# Patient Record
Sex: FEMALE | Race: OTHER
[De-identification: names, ages, dates, MRNs, and addresses within clinical notes are randomized per-mention and may not be internally consistent; named-entity substitution may affect disease eponyms.]

---

## 2018-09-28 ENCOUNTER — HOSPITAL ENCOUNTER (EMERGENCY)
Dept: HOSPITAL 80 - FED | Age: 23
Discharge: HOME | End: 2018-09-28
Payer: MEDICAID

## 2018-09-28 VITALS — DIASTOLIC BLOOD PRESSURE: 81 MMHG | SYSTOLIC BLOOD PRESSURE: 114 MMHG

## 2018-09-28 DIAGNOSIS — Z91.038: ICD-10-CM

## 2018-09-28 DIAGNOSIS — M79.89: ICD-10-CM

## 2018-09-28 DIAGNOSIS — L50.9: Primary | ICD-10-CM

## 2018-09-28 NOTE — EDPHY
H & P


Stated Complaint: R arm swelling


Time Seen by Provider: 09/28/18 09:30


HPI/ROS: 





CHIEF COMPLAINT:  Itching lesion right arm





HISTORY OF PRESENT ILLNESS:  23-year-old female self-described history of 

intense reaction from insect bites complaining of highly pruritic sudden-onset 

lesion to her right upper extremity.  Nontender.  Started yesterday afternoon.  

No systemic symptoms.  Denies:  Chest pain, dyspnea, wheezing, abdominal pain, 

nausea, vomiting, headache, new medications.





PRIMARY CARE PROVIDER:  Daxa Vela





REVIEW OF SYSTEMS:


10 systems reviewed and negative with the exception of the elements mentioned 

in the history of present illness








PAST MEDICAL & SURGICAL  HISTORY:  Prior history of hypersensitivity reaction 

to insect bites.  No history of anaphylaxis.





SOCIAL HISTORY:Nonsmoker     











************


PHYSICAL EXAM





(Prior to examination, patient consented to physical exam, hands were washed 

and my usual and customary physical exam procedures followed)


1) GENERAL: Well-developed, well-nourished, alert and oriented.  Appears to be 

in no acute distress.


2) HEAD: Normocephalic, atraumatic


3) HEENT: Pupils equal, round, reactive to light bilaterally.  Sclera 

anicteric.  Nasopharynx, oropharynx, clear, no lesions. Moist Mucous membranes. 

Ears bilaterally with normal tympanic membranes.


4) NECK: Full range of motion, no meningeal signs.


5) LUNGS: Clear auscultation bilaterally, no wheezes, no rhonchi, no 

retractions.   


6) HEART: Regular rate and rhythm, no murmur, no heave, no gallop.


7) ABDOMEN: No guarding, no rebound, no focal tenderness, negative McBurney's, 

negative Ignacio's, negative Rovsing's, negative peritoneal sign,


8) MUSCULOSKELETAL:  Right upper extremity:  The patient's biceps and triceps 

region she has raised salmon colored lesions consistent with urticaria  Moving 

all extremities, no focal areas of tenderness, no obvious trauma.  No 

peripheral edema or discoloration.


9) BACK: No CVA tenderness, no midline vertebral tenderness, no fluctuance, no 

step-off, no obvious trauma, no visual or palpable abnormality. 


10) SKIN:  Urticaric-like rash to the right upper extremity 


11) Psychiatric:  Patient is oriented X 3, there is no agitation.








***************





DIFFERENTIAL DIAGNOSIS:   In no particular order including but limited to 

urticaria, hypersensitivity reaction, anaphylaxis








- Personal History


LMP (Females 10-55): Irregular





- Medical/Surgical History


Hx Asthma: No


Hx Chronic Respiratory Disease: No


Hx Diabetes: No


Hx Cardiac Disease: No


Hx Renal Disease: No


Hx Cirrhosis: No


Hx Alcoholism: No


Hx HIV/AIDS: No


Hx Splenectomy or Spleen Trauma: No


Other PMH: denies





- Social History


Smoking Status: Never smoked


Constitutional: 


 Initial Vital Signs











Temperature (C)  37.1 C   09/28/18 09:16


 


Heart Rate  75   09/28/18 09:16


 


Respiratory Rate  18   09/28/18 09:16


 


Blood Pressure  114/81 H  09/28/18 09:16


 


O2 Sat (%)  99   09/28/18 09:16








 











O2 Delivery Mode               Room Air














Allergies/Adverse Reactions: 


 





No Known Allergies Allergy (Unverified 09/28/18 09:16)


 








Home Medications: 














 Medication  Instructions  Recorded


 


Famotidine [Pepcid] 20 mg PO BID #10 tablet 09/28/18


 


diphenhydrAMINE [Benadryl 25 MG 25 mg PO Q6 PRN #10 tab 09/28/18





(*)]  


 


predniSONE [Prednisone] 20 mg PO DAILY #9 tablet 09/28/18














Medical Decision Making


ED Course/Re-evaluation: 





I think the patient's symptoms are more than likely consistent with urticaria 

and/or acute hypersensitivity reaction possible insect bite.  She has no 

evidence of diffuse reaction such as dyspnea, vomiting, airway compromise.  She 

has no new medications.  The patient appears well overall.  I Think she can be 

discharged.  She has no evidence of super infection.  Doubt anaphylaxis.  Doubt 

angioedema.  I recommended H1 H2 blockers, short course of steroids.  My usual 

and customary allergic reaction precautions instructions provided.  She feels 

comfortable being discharged.  I saw this patient independently based on 

established practice protocols.  Care of patient under supervision of  

secondary supervising physician Dr Ho .





Departure





- Departure


Disposition: Home, Routine, Self-Care


Clinical Impression: 


 Urticaria





Hypersensitivity reaction


Qualifiers:


 Encounter type: initial encounter Qualified Code(s): T78.40XA - Allergy, 

unspecified, initial encounter





Condition: Good


Instructions:  Urticaria (ED)


Additional Instructions: 


Call 911 if you develop shortness of breath, difficulty swallowing, abdominal 

pain, vomiting or any other symptoms that concern you


Referrals: 


Daxa Vela NP [Primary Care Provider] - 2-3 days, call for appt.


Prescriptions: 


diphenhydrAMINE [Benadryl 25 MG (*)] 25 mg PO Q6 PRN #10 tab


 PRN Reason: Itching


Famotidine [Pepcid] 20 mg PO BID #10 tablet


predniSONE [Prednisone] 20 mg PO DAILY #9 tablet